# Patient Record
Sex: FEMALE | Race: BLACK OR AFRICAN AMERICAN | NOT HISPANIC OR LATINO | Employment: STUDENT | URBAN - NONMETROPOLITAN AREA
[De-identification: names, ages, dates, MRNs, and addresses within clinical notes are randomized per-mention and may not be internally consistent; named-entity substitution may affect disease eponyms.]

---
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
## 2025-01-31 ENCOUNTER — OFFICE VISIT (OUTPATIENT)
Dept: INTERNAL MEDICINE | Facility: CLINIC | Age: 20
End: 2025-01-31
Payer: COMMERCIAL

## 2025-01-31 VITALS
RESPIRATION RATE: 18 BRPM | HEART RATE: 88 BPM | SYSTOLIC BLOOD PRESSURE: 114 MMHG | HEIGHT: 60 IN | BODY MASS INDEX: 28.86 KG/M2 | DIASTOLIC BLOOD PRESSURE: 76 MMHG | TEMPERATURE: 98 F | OXYGEN SATURATION: 99 % | WEIGHT: 147 LBS

## 2025-01-31 DIAGNOSIS — F41.9 ANXIETY: Primary | ICD-10-CM

## 2025-01-31 DIAGNOSIS — J45.20 MILD INTERMITTENT ASTHMA WITHOUT COMPLICATION: ICD-10-CM

## 2025-01-31 DIAGNOSIS — J04.0 ACUTE LARYNGITIS: ICD-10-CM

## 2025-01-31 DIAGNOSIS — R09.82 POSTNASAL DRIP: ICD-10-CM

## 2025-01-31 DIAGNOSIS — J30.1 SEASONAL ALLERGIC RHINITIS DUE TO POLLEN: ICD-10-CM

## 2025-01-31 PROCEDURE — 99214 OFFICE O/P EST MOD 30 MIN: CPT | Performed by: PHYSICIAN ASSISTANT

## 2025-01-31 RX ORDER — MONTELUKAST SODIUM 10 MG/1
10 TABLET ORAL NIGHTLY
Qty: 90 TABLET | Refills: 3 | Status: SHIPPED | OUTPATIENT
Start: 2025-01-31

## 2025-01-31 RX ORDER — OMEPRAZOLE 40 MG/1
40 CAPSULE, DELAYED RELEASE ORAL DAILY
Qty: 30 CAPSULE | Refills: 0 | Status: SHIPPED | OUTPATIENT
Start: 2025-01-31

## 2025-01-31 NOTE — LETTER
Jury Duty    1/31/2025    To Whom It May Concern:    Joy Bach is currently a patient under my medical care.  The patient's medical condition makes serving on jury duty inadvisable at this time.  Please excuse them from serving.  If you require additional information please do not hesitate to contact my office.      Sincerely,        Erika Merrill PA-C

## 2025-01-31 NOTE — PROGRESS NOTES
Office Visit      Patient Name: Joy Bach  : 2005   MRN: [id number]     Chief Complaint:    Chief Complaint   Patient presents with    Laryngitis     X2 weeks worse at night was seen at      Nasal Congestion     X2 weeks     Anxiety     Jury duty form        History of Present Illness: Joy Bach is a 19 y.o. female who is here today to discuss laryngitis. She has had around 2 weeks of nasal congestion and hoarse voice. Nasal congestion is minor throughout the day and her voice is hoarse but then in the evenings she completely loses her voice. On 2025 she was seen at [facility] urgent care and diagnosed with laryngitis, pharyngitis and mild asthma exacerbation. She was prescribed azithromycin, prednisone 40 mg daily x 5 days and and albuterol inhaler. She took prednisone the first 2 days but did not finish it. She did complete azithromycin. No coughing. No heartburn or indigestion. Sore throat resolved.     She has been notified to report for jury duty but is very concerned that she will be too anxious to participate as historically she has been unable to watch any shows or movies related to legal issues or court rooms due to it making her feel very anxious.      Subjective      I have reviewed and the following portions of the patient's history were updated as appropriate: past family history, past medical history, past social history, past surgical history and problem list.      Current Outpatient Medications:     albuterol sulfate  (90 Base) MCG/ACT inhaler, Inhale 2 puffs Every 4 (Four) Hours As Needed for Wheezing or Shortness of Air., Disp: 18 g, Rfl: 0    montelukast (Singulair) 10 MG tablet, Take 1 tablet by mouth Every Night., Disp: 90 tablet, Rfl: 3    ibuprofen (ADVIL,MOTRIN) 800 MG tablet, 1 po q 8 h with food prn pain (Patient not taking: Reported on 2025), Disp: 30 tablet, Rfl: 0    omeprazole (priLOSEC) 40 MG capsule, Take 1 capsule by  "mouth Daily., Disp: 30 capsule, Rfl: 0    predniSONE (DELTASONE) 20 MG tablet, Take 2 tablets by mouth Daily. (Patient not taking: Reported on 1/31/2025), Disp: 10 tablet, Rfl: 0    No Known Allergies    Objective     Vital Signs:   Vitals:    01/31/25 1340   BP: 114/76   Pulse: 88   Resp: 18   Temp: 98 °F (36.7 °C)   SpO2: 99%   Weight: 66.7 kg (147 lb)   Height: 152.4 cm (60\")     Body mass index is 28.71 kg/m².    Physical Exam  Vitals and nursing note reviewed.   Constitutional:       General: She is not in acute distress.     Appearance: She is well-developed. She is not ill-appearing, toxic-appearing or diaphoretic.   HENT:      Head: Normocephalic and atraumatic.      Right Ear: Tympanic membrane, ear canal and external ear normal. There is no impacted cerumen.      Left Ear: Tympanic membrane, ear canal and external ear normal. There is no impacted cerumen.      Nose: Mucosal edema, congestion and rhinorrhea present.      Right Sinus: No maxillary sinus tenderness or frontal sinus tenderness.      Left Sinus: No maxillary sinus tenderness or frontal sinus tenderness.      Mouth/Throat:      Mouth: Mucous membranes are moist.      Pharynx: No posterior oropharyngeal erythema.      Comments: White postnasal drip present  Eyes:      General: No scleral icterus.     Extraocular Movements: Extraocular movements intact.      Conjunctiva/sclera: Conjunctivae normal.      Pupils: Pupils are equal, round, and reactive to light.   Cardiovascular:      Rate and Rhythm: Normal rate and regular rhythm.      Heart sounds: Normal heart sounds. No murmur heard.     No friction rub. No gallop.   Pulmonary:      Effort: Pulmonary effort is normal. No respiratory distress.      Breath sounds: Normal breath sounds. No wheezing, rhonchi or rales.   Chest:      Chest wall: No tenderness.   Musculoskeletal:         General: No tenderness or deformity. Normal range of motion.      Cervical back: Normal range of motion and neck " supple. No rigidity or tenderness.      Right lower leg: No edema.      Left lower leg: No edema.   Lymphadenopathy:      Cervical: No cervical adenopathy.   Skin:     General: Skin is warm and dry.      Capillary Refill: Capillary refill takes less than 2 seconds.      Coloration: Skin is not jaundiced or pale.      Findings: No rash.   Neurological:      Mental Status: She is alert and oriented to person, place, and time.      Cranial Nerves: No cranial nerve deficit.      Sensory: No sensory deficit.      Motor: No abnormal muscle tone.      Coordination: Coordination normal.      Gait: Gait normal.      Deep Tendon Reflexes: Reflexes normal.   Psychiatric:         Mood and Affect: Mood normal.         Behavior: Behavior normal.         Thought Content: Thought content normal.         Judgment: Judgment normal.               Assessment / Plan      Assessment/Plan:   Diagnoses and all orders for this visit:    1. Anxiety (Primary)    2. Acute laryngitis  -     Ambulatory Referral to ENT (Otolaryngology)  -     omeprazole (priLOSEC) 40 MG capsule; Take 1 capsule by mouth Daily.  Dispense: 30 capsule; Refill: 0    3. Postnasal drip  -     Ambulatory Referral to ENT (Otolaryngology)  -     montelukast (Singulair) 10 MG tablet; Take 1 tablet by mouth Every Night.  Dispense: 90 tablet; Refill: 3    4. Mild intermittent asthma without complication  -     montelukast (Singulair) 10 MG tablet; Take 1 tablet by mouth Every Night.  Dispense: 90 tablet; Refill: 3    5. Seasonal allergic rhinitis due to pollen  -     montelukast (Singulair) 10 MG tablet; Take 1 tablet by mouth Every Night.  Dispense: 90 tablet; Refill: 3       Provided with a jury duty excuse due to history of anxiety.    Begin Singulair nightly and omeprazole daily and attempt to treat postnasal drip and possible GERD as causes of laryngitis.  ENT referral ordered.        Follow Up:   Return if symptoms worsen or fail to improve.    Patient was given  instructions and counseling regarding her condition or for health maintenance advice. Please see specific information pulled into the AVS if appropriate.     Erika Merrill PA-C  [facility]     Please note that portions of this note may have been completed with a voice recognition program.

## 2025-03-07 NOTE — PROGRESS NOTES
Detail Level: Detailed Subjective     Joy Bach is a 14 y.o. female who is here for this well-child visit.    History was provided by the mother & patient. Markus states she uses albuterol inhaler prior to physical activity, it is effective; she does not feel SOA when she uses it before exercise. Her left knee hurts with physical activity, aches.  Does not hurt after activity.  No instability, limp, altered ROM noted.  Does not recall injury to left knee.       Immunization History   Administered Date(s) Administered   • DTaP 03/04/2005, 05/04/2005, 08/19/2005, 04/07/2006, 06/26/2009   • Hep A, 2 Dose 04/05/2019, 08/14/2020   • Hepatitis B 2005, 03/04/2005, 04/07/2006   • HiB 03/04/2005, 05/04/2005, 08/19/2005, 04/07/2006   • Hpv9 07/18/2017, 08/14/2020   • IPV 03/04/2005, 04/07/2006, 01/07/2007, 06/26/2009   • MMR 04/07/2006, 06/26/2009   • Meningococcal Conjugate 09/13/2017   • PEDS-Pneumococcal Conjugate (PCV7) 03/04/2005, 05/04/2005, 08/18/2005, 06/29/2009   • Tdap 07/18/2017   • Varicella 04/07/2006, 06/29/2009     The following portions of the patient's history were reviewed and updated as appropriate: allergies, current medications, past family history, past medical history, past social history, past surgical history and problem list.    Current Issues:  Current concerns include irregular menses, wants to start birth control.  Currently menstruating? yes; current menstrual pattern: flow is moderate and irregular since menses began when she was 10 years old.  Sexually active? no, but does have a boyfriend she is considering having sex with.  When asked, states she can and will wait until birth control prescribed to be sexually active.      Review of Nutrition:  Current diet: typical American  Balanced diet? yes    Social Screening:   Parental relations: good  Sibling relations: 2 sisters, 2 brothers  Discipline concerns? no  Concerns regarding behavior with peers? no  School performance: doing well; no  "concerns  Secondhand smoke exposure? yes - older brother, does not smoke around her in the home    During the past three months, have you thought of killing yourself?  no  Have you ever tried to kill yourself?  no      Objective      Vitals:    08/14/20 1033   BP: 121/72   Pulse: 67   Temp: 98.4 °F (36.9 °C)   TempSrc: Temporal   SpO2: 100%   Weight: 68.9 kg (152 lb)   Height: 157.5 cm (62\")       Growth parameters are noted and are appropriate for age.    Clothing Status fully clothed   General:   alert, appears stated age, cooperative and no distress   Gait:   normal   Skin:   normal   Oral cavity:   lips, mucosa, and tongue normal; teeth and gums normal   Eyes:   sclerae white, pupils equal and reactive   Ears:   normal bilaterally   Neck:   no adenopathy, supple, symmetrical, trachea midline and thyroid not enlarged, symmetric, no tenderness/mass/nodules   Lungs:  clear to auscultation bilaterally   Heart:   regular rate and rhythm, S1, S2 normal, no murmur, click, rub or gallop   Abdomen:  soft, non-tender; bowel sounds normal; no masses,  no organomegaly   :  exam deferred   Randall Stage:   deferred   Extremities:  extremities normal, atraumatic, no cyanosis or edema   Neuro:  normal without focal findings, mental status, speech normal, alert and oriented x3, NANETTE, cranial nerves 2-12 intact, muscle tone and strength normal and symmetric, sensation grossly normal and gait and station normal     Assessment/Plan     Well adolescent.     Blood Pressure Risk Assessment    Child with specific risk conditions or change in risk No   Action NA   Vision Assessment    Do you have concerns about how your child sees? No   Do your child's eyes appear unusual or seem to cross, drift, or lazy? No   Do your child's eyelids droop or does one eyelid tend to close? No   Have your child's eyes ever been injured? No   Dose your child hold objects close when trying to focus? No   Action NA   Hearing Assessment    Do you have " concerns about how your child hears? No   Do you have concerns about how your child speaks?  No   Action NA   Tuberculosis Assessment    Has a family member or contact had tuberculosis or a positive tuberculin skin test? No   Was your child born in a country at high risk for tuberculosis (countries other than the [place], [place], [place], [place], or [place]?) No   Has your child traveled (had contact with resident populations) for longer than 1 week to a country at high risk for tuberculosis? No   Is your child infected with HIV? No   Action NA   Anemia Assessment    Do you ever struggle to put food on the table? No   Does your child's diet include iron-rich foods such as meat, eggs, iron-fortified cereals, or beans? Yes   Action NA   Dyslipidemia Assessment    Does your child have parents or grandparents who have had a stroke or heart problem before age 55? No   Does your child have a parent with elevated blood cholesterol (240 mg/dL or higher) or who is taking cholesterol medication? No   Action: NA   Alcohol & Drugs    Have you ever had an alcoholic drink? No   Have you ever used maijuana or any other drug to get high? No   Action: TEJAS Hamilton was seen today for well child.    Diagnoses and all orders for this visit:    Annual physical exam    Sports physical    Mild intermittent asthma without complication  -     albuterol sulfate  (90 Base) MCG/ACT inhaler; Inhale 2 puffs Every 6 (Six) Hours As Needed for Wheezing or Shortness of Air.  -     montelukast (Singulair) 10 MG tablet; Take 1 tablet by mouth Every Night.    Seasonal allergic rhinitis due to pollen  -     montelukast (Singulair) 10 MG tablet; Take 1 tablet by mouth Every Night.  - Avoid known allergens, when possible  - HEPA filters in vacuum and HVAC    Primary oligomenorrhea  -     Ambulatory Referral to Gynecology  -     T4, Free  -     TSH  - Advised it would be best to be evaluated for irregular periods prior to  initiating birth control.      Need for vaccination  -     HPV Vaccine (HPV9)  -     Hepatitis A Vaccine Pediatric / Adolescent (HAVRIX) - Today  -     Hepatitis A Vaccine Pediatric / Adolescent (HAVRIX) - Dose 2; Future  -     HPV Vaccine (HPV9); Future    Chronic pain of left knee        - Consider wearing a brace when exercising, dancing        - Recommended exercises to build up muscles around knee for increased stability and support.        1. Anticipatory guidance discussed.  Gave handout on well-child issues at this age.    2.  Weight management:  The patient was counseled regarding behavior modifications, nutrition and physical activity.    3. Development: appropriate for age    4. Immunizations today: Hep A and HPV    5. Follow-up visit in 1 year for next well child visit, or sooner as needed.

## 2025-08-09 ENCOUNTER — PATIENT ROUNDING (BHMG ONLY) (OUTPATIENT)
Dept: URGENT CARE | Facility: CLINIC | Age: 20
End: 2025-08-09
Payer: COMMERCIAL